# Patient Record
Sex: FEMALE | Race: WHITE | NOT HISPANIC OR LATINO | Employment: FULL TIME | URBAN - METROPOLITAN AREA
[De-identification: names, ages, dates, MRNs, and addresses within clinical notes are randomized per-mention and may not be internally consistent; named-entity substitution may affect disease eponyms.]

---

## 2023-02-03 ENCOUNTER — HOSPITAL ENCOUNTER (EMERGENCY)
Facility: HOSPITAL | Age: 38
Discharge: HOME | End: 2023-02-03
Attending: EMERGENCY MEDICINE
Payer: COMMERCIAL

## 2023-02-03 VITALS
BODY MASS INDEX: 43.32 KG/M2 | HEART RATE: 74 BPM | OXYGEN SATURATION: 99 % | TEMPERATURE: 97 F | WEIGHT: 260 LBS | DIASTOLIC BLOOD PRESSURE: 87 MMHG | RESPIRATION RATE: 18 BRPM | HEIGHT: 65 IN | SYSTOLIC BLOOD PRESSURE: 148 MMHG

## 2023-02-03 DIAGNOSIS — S39.012A LUMBAR STRAIN, INITIAL ENCOUNTER: Primary | ICD-10-CM

## 2023-02-03 DIAGNOSIS — M53.3 SACROILIAC JOINT DISEASE: ICD-10-CM

## 2023-02-03 PROCEDURE — 63600175 PHARM REV CODE 636 W HCPCS: Performed by: PHYSICIAN ASSISTANT

## 2023-02-03 PROCEDURE — 96372 THER/PROPH/DIAG INJ SC/IM: CPT | Performed by: PHYSICIAN ASSISTANT

## 2023-02-03 PROCEDURE — 99284 EMERGENCY DEPT VISIT MOD MDM: CPT

## 2023-02-03 PROCEDURE — 25000003 PHARM REV CODE 250: Performed by: PHYSICIAN ASSISTANT

## 2023-02-03 RX ORDER — KETOROLAC TROMETHAMINE 10 MG/1
10 TABLET, FILM COATED ORAL EVERY 6 HOURS
Qty: 20 TABLET | Refills: 0 | Status: SHIPPED | OUTPATIENT
Start: 2023-02-03 | End: 2023-02-08

## 2023-02-03 RX ORDER — METHOCARBAMOL 500 MG/1
500 TABLET, FILM COATED ORAL
Qty: 20 TABLET | Refills: 0 | Status: SHIPPED | OUTPATIENT
Start: 2023-02-03 | End: 2023-02-13

## 2023-02-03 RX ORDER — KETOROLAC TROMETHAMINE 30 MG/ML
60 INJECTION, SOLUTION INTRAMUSCULAR; INTRAVENOUS
Status: COMPLETED | OUTPATIENT
Start: 2023-02-03 | End: 2023-02-03

## 2023-02-03 RX ORDER — METHOCARBAMOL 500 MG/1
1000 TABLET, FILM COATED ORAL
Status: COMPLETED | OUTPATIENT
Start: 2023-02-03 | End: 2023-02-03

## 2023-02-03 RX ADMIN — METHOCARBAMOL 1000 MG: 500 TABLET ORAL at 02:02

## 2023-02-03 RX ADMIN — KETOROLAC TROMETHAMINE 60 MG: 30 INJECTION, SOLUTION INTRAMUSCULAR; INTRAVENOUS at 02:02

## 2023-02-03 NOTE — ED PROVIDER NOTES
Encounter Date: 2/3/2023       History     Chief Complaint   Patient presents with    Back Pain     Pt to er c/o having backpain for the past two months and is now worse.     37-year-old obese female comes in with complaint of low back pain.  Her pain has been constant in nature for the last few weeks.  Aggravate throughout the date.  Patient states her pain does not radiate to the lower extremities most of her pain is localized into the lower lumbar/sacroiliac bilaterally.       Back Pain   This is a recurrent problem. The current episode started several days ago. The problem occurs throughout the day. The problem has been waxing and waning. The pain is associated with no known injury. The pain is present in the sacro-iliac joint and lumbar spine. The symptoms are aggravated by bending. The pain is Worse during the day. Pertinent negatives include no chest pain, no fever, no numbness, no headaches, no bowel incontinence, no dysuria, no pelvic pain, no tingling and no weakness. She has tried NSAIDs, ice, heat and analgesics for the symptoms. Risk factors include obesity.   Review of patient's allergies indicates:  No Known Allergies  No past medical history on file.  No past surgical history on file.  No family history on file.     Review of Systems   Constitutional:  Negative for fever.   HENT:  Negative for sore throat.    Respiratory:  Negative for shortness of breath.    Cardiovascular:  Negative for chest pain.   Gastrointestinal:  Negative for bowel incontinence and nausea.   Genitourinary:  Negative for dysuria and pelvic pain.   Musculoskeletal:  Positive for back pain.   Skin:  Negative for rash.   Neurological:  Negative for tingling, weakness, numbness and headaches.   Hematological:  Does not bruise/bleed easily.     Physical Exam     Initial Vitals [02/03/23 1318]   BP Pulse Resp Temp SpO2   (!) 154/93 77 20 97.2 °F (36.2 °C) 98 %      MAP       --         Physical Exam    Vitals  reviewed.  Cardiovascular:  Normal rate.           Pulmonary/Chest: Breath sounds normal.   Musculoskeletal:      Cervical back: Normal.      Thoracic back: Normal.      Lumbar back: Spasms, tenderness and bony tenderness present. No swelling or lacerations. Normal range of motion. Positive left straight leg raise test.      Comments: Negative for straight leg raise bilaterally.  Facet degenerative pain.  Positive for sacroiliac joint pain bilaterally positive for sacral compression     Neurological: She is alert and oriented to person, place, and time.   Skin: Skin is warm.   Psychiatric: Her behavior is normal. Judgment and thought content normal.       ED Course   Procedures  Labs Reviewed - No data to display       Imaging Results              X-Ray Lumbar Spine 5 View (Final result)  Result time 02/03/23 14:52:42      Final result by José Miguel Gambino MD (02/03/23 14:52:42)                   Impression:      No acute findings.      Electronically signed by: José Miguel Gambino  Date:    02/03/2023  Time:    14:52               Narrative:    EXAMINATION:  XR LUMBAR SPINE COMPLETE 5 VIEW    CLINICAL HISTORY:  Back pain    COMPARISON:  None    FINDINGS:  Frontal and lateral views of the lumbar spine.  Alignment is within normal limits.  There is no significant loss of vertebral body height.  There is minimal degenerative change.                                       Medications   methocarbamoL tablet 1,000 mg (1,000 mg Oral Given 2/3/23 1424)   ketorolac injection 60 mg (60 mg Intramuscular Given 2/3/23 1424)     Medical Decision Making:   Initial Assessment:   37-year-old obese female comes in with complaint of low back pain.  Her pain has been constant in nature for the last few weeks.  Aggravate throughout the date.  Patient states her pain does not radiate to the lower extremities most of her pain is localized into the lower lumbar/sacroiliac bilaterally.       Differential Diagnosis:   Lumbar strain, lumbar radicular  pain, sacroiliitis bilaterally, lumbar facets generated pain, lumbar degenerative disease  Clinical Tests:   Radiological Study: Reviewed  ED Management:  Ready evaluation shows positive for facet joint pain no acute abnormality  For her lumbar pain and sacroiliac joint pain treatment here the ED we provided relief with an IM ketorolac and p.o. Robaxin.  Evaluated patient 35-40 minutes later patient states pain has been decreased to a 3/10 at its worst  The patient is resting comfortably in no acute distress.  He is hemodynamically stable and is without objective evidence for acute process requiring urgent intervention or hospitalization. I provided counseling to patient with regard to condition, the treatment plan, specific conditions for return, and the importance of follow up. Detailed written and verbal instructions provided to patient and he expressed a verbal understanding of the discharge instructions and overall management plan. Reiterated the importance of medication administration and safety and advised patient to follow up with primary care provider in 3-5 days or sooner if needed.  Answered questions at this time. The patient is stable for discharge.       Explained to patient to follow up with Dr. Cecelia Youssef for further imaging for her lumbar spine and evaluation possibly to treat facet joint mediated pain.           ED Course as of 02/03/23 1524   Fri Feb 03, 2023   1519 Improved lumbar and sacroiliac pain  [YG]      ED Course User Index  [YG] PHYLLIS Pham                 Clinical Impression:   Final diagnoses:  [S39.012A] Lumbar strain, initial encounter (Primary)  [M53.3] Sacroiliac joint disease        ED Disposition Condition    Discharge Stable          ED Prescriptions       Medication Sig Dispense Start Date End Date Auth. Provider    ketorolac (TORADOL) 10 mg tablet Take 1 tablet (10 mg total) by mouth every 6 (six) hours. for 5 days 20 tablet 2/3/2023 2/8/2023 PHYLLIS Pham     methocarbamoL (ROBAXIN) 500 MG Tab Take 1 tablet (500 mg total) by mouth 2 times daily 2 hours after meal. for 10 days 20 tablet 2/3/2023 2/13/2023 PHYLLIS Pham          Follow-up Information       Follow up With Specialties Details Why Contact Info    [facility] - Emergency Dept Emergency Medicine  If symptoms worsen [id number]  [facility] [id number]  [phone]             PHYLLIS Pham  02/03/23 1529       Leana Palmer MD  02/03/23 7426

## 2023-03-15 ENCOUNTER — OFFICE VISIT (OUTPATIENT)
Dept: PAIN MEDICINE | Facility: CLINIC | Age: 38
End: 2023-03-15
Payer: COMMERCIAL

## 2023-03-15 ENCOUNTER — HOSPITAL ENCOUNTER (OUTPATIENT)
Dept: RADIOLOGY | Facility: HOSPITAL | Age: 38
Discharge: HOME | End: 2023-03-15
Attending: NURSE PRACTITIONER
Payer: COMMERCIAL

## 2023-03-15 VITALS
BODY MASS INDEX: 48.82 KG/M2 | HEIGHT: 65 IN | SYSTOLIC BLOOD PRESSURE: 129 MMHG | TEMPERATURE: 98 F | WEIGHT: 293 LBS | DIASTOLIC BLOOD PRESSURE: 105 MMHG | HEART RATE: 95 BPM

## 2023-03-15 DIAGNOSIS — G89.29 CHRONIC SI JOINT PAIN: ICD-10-CM

## 2023-03-15 DIAGNOSIS — M53.3 CHRONIC SI JOINT PAIN: ICD-10-CM

## 2023-03-15 DIAGNOSIS — S39.012S LUMBAR STRAIN, SEQUELA: Primary | ICD-10-CM

## 2023-03-15 DIAGNOSIS — M53.3 SACROILIAC JOINT DISEASE: ICD-10-CM

## 2023-03-15 DIAGNOSIS — E66.3 OVERWEIGHT: ICD-10-CM

## 2023-03-15 PROCEDURE — 3074F PR MOST RECENT SYSTOLIC BLOOD PRESSURE < 130 MM HG: ICD-10-PCS | Mod: CPTII,,, | Performed by: NURSE PRACTITIONER

## 2023-03-15 PROCEDURE — 3080F DIAST BP >= 90 MM HG: CPT | Mod: CPTII,,, | Performed by: NURSE PRACTITIONER

## 2023-03-15 PROCEDURE — 3008F PR BODY MASS INDEX (BMI) DOCUMENTED: ICD-10-PCS | Mod: CPTII,,, | Performed by: NURSE PRACTITIONER

## 2023-03-15 PROCEDURE — 1160F PR REVIEW ALL MEDS BY PRESCRIBER/CLIN PHARMACIST DOCUMENTED: ICD-10-PCS | Mod: CPTII,,, | Performed by: NURSE PRACTITIONER

## 2023-03-15 PROCEDURE — 72202 X-RAY EXAM SI JOINTS 3/> VWS: CPT | Mod: TC

## 2023-03-15 PROCEDURE — 3008F BODY MASS INDEX DOCD: CPT | Mod: CPTII,,, | Performed by: NURSE PRACTITIONER

## 2023-03-15 PROCEDURE — 1160F RVW MEDS BY RX/DR IN RCRD: CPT | Mod: CPTII,,, | Performed by: NURSE PRACTITIONER

## 2023-03-15 PROCEDURE — 3074F SYST BP LT 130 MM HG: CPT | Mod: CPTII,,, | Performed by: NURSE PRACTITIONER

## 2023-03-15 PROCEDURE — 99205 PR OFFICE/OUTPT VISIT, NEW, LEVL V, 60-74 MIN: ICD-10-PCS | Mod: ,,, | Performed by: NURSE PRACTITIONER

## 2023-03-15 PROCEDURE — 1159F MED LIST DOCD IN RCRD: CPT | Mod: CPTII,,, | Performed by: NURSE PRACTITIONER

## 2023-03-15 PROCEDURE — 3080F PR MOST RECENT DIASTOLIC BLOOD PRESSURE >= 90 MM HG: ICD-10-PCS | Mod: CPTII,,, | Performed by: NURSE PRACTITIONER

## 2023-03-15 PROCEDURE — 99205 OFFICE O/P NEW HI 60 MIN: CPT | Mod: ,,, | Performed by: NURSE PRACTITIONER

## 2023-03-15 PROCEDURE — 1159F PR MEDICATION LIST DOCUMENTED IN MEDICAL RECORD: ICD-10-PCS | Mod: CPTII,,, | Performed by: NURSE PRACTITIONER

## 2023-03-15 RX ORDER — ACETAMINOPHEN 325 MG/1
650 TABLET ORAL EVERY 6 HOURS PRN
COMMUNITY

## 2023-03-15 NOTE — PATIENT INSTRUCTIONS
Call your  or HR contact at work to confirm your insurance covers office visits, labs and physical therapy, treatments,etc with [facility] in [place], [place]     If [facility] with your insurance company, you will need to complete the  following items and call back to schedule a follow up visit.       1. Ordered labs: CMP, CBC baseline labs. Collect these at any [facility] in [place], [place] if insurance approves   2. Ordered Bilateral SI joint x-ray complete views today. Will call patient back with results and recommendation.   3. Sent Dietitian consult to assist with weight loss. They will call you to schedule.  4.  Physical therapy needed. Patient to call back and relay what local PT clinic is in her network. I will place order at that time to evaluate and treat bilateral SI joint pain and lumbar strain    5.  Follow-up to be determined pending above items completed.

## 2023-03-15 NOTE — PROGRESS NOTES
Subjective:      Patient ID: Ingrid Dyer is a 37 y.o. female.    Chief Complaint: Back Pain (1 month pain in the lower back to the sciatic. Goes down the legs  Taking OTC which helps a little. Home exercise)    Referred by: Kayla Waggoner PA     HPI: Patient presents as a new consult from ER for lumbar strain, initial encounter and chronic sacroiliac joint pain. Patient hs PCP Vikas Logan consult pending in [place].     Pain located to right buttock with shooting pain down posterior right leg down to right foot and great toe on right foot. Feels like a stabbing pain. Pain worse with prolonged sitting, walking. Sleep interrupted. She can only lie in a fetal postion on the . Pain improved with sitting on firm couch with cushion. Standing improves pain and prolonged walking makes it worse. She works at [employer]. 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 She [place] during [place] and had to make several stops to sit due to pain to right buttock and shooting down right leg, foot and great toe.     Pain started spontaneously beginning of January without injury. No prior back surgeries. She went to ER as she did not have a PCP. She has a new patient consult with PCP appointment pending. Last ER visit 2/3/2023. She received a muscle relaxer and IM ketorolac. This reduced her pain to 3/10.     Current pain rating 2/10 today. >walking raises pain to 5/10. Pain highest 9/10 if she moves wrong way, prolonged sitting and sleeping on her bed vs. Couch. She has ordered a new bed. Pain meds include Tylenol 500 mg BID, smoking marijuana, No NSAID'S, No Celebrex or Meloxicam, biofreeze. No Diclonac gel or oral med. No Lidoderm patch.     Only imaging patient has of spine is lumbar Xray with recent ER visit. No acute findings and minimal degenerative changes. No PT visits as she just started receiving insurance.       PMH: no HTN dx,   PSH: appendectomy  Negative spine surgeries or  interventional pain procedures    Interventional Pain History  none    ROS:  Right upper buttock, right leg and right foot pain    SI joint imaging:   None on file    Imaging lumbar spine  2023  X-ray lumbar spine complete five view  IMPRESSION:    No acute findings  Frontal and lateral views of the lumbar spine.  Alignment is within normal limits.  There is no significant loss of vertebral body height.  There is minimal degenerative change.    MRI lumbar spine:  None in electronic medical record,  or care everywhere    SI joint imaging:   None in electronic medical record,  or care everywhere    Pertinent labs:  None in electronic medical record,  or care everywhere        Objective:          Physical Exam  General: Well developed; morbidly obese ; A&O x 3; No anxiety/depression; NAD  Mental Status: Oriented to person, palce and time. Displays appropriate mood & affect.  Head: Norm cephalic and atraumatic  Neck: Midline trachea  Eyes: normal conjunctiva, normal lids, normal pupils  ENT and mouth: normal external ear, nose, and no lesions noted on the lips.  Respiratory: Symmetrical, Unlabored. No dyspnea  CV: normal  S1/S2, normal rhythm and rate. No peripheral edema.   Abdomen: pendulus    Extremities:  Gen: No cyanosis or tenderness to palpation bilateral upper and lower extremities  Skin: Warm, pink, dry, no rashes, no lesions on the lumbar spine  Strength: 5/5 motor strength bilateral upper and lower  ROM: Full ROM in bilateral knees and ankles without pain or instability.    Neuro:  Gait: no altalgic lean, normal toe and heel raise. Painful gait . Independent ambulator  DTR's: 2+ in bilateral patellar  Sensory: Intact to light touch bilateral  upper and lower extremities. Walking  causes numbness to right thigh.     Spine: Normal lordosis. No scoliosis  L-spine ROM: Full ROM and no pain with  flexion, extension, bilateral rotation,   Straight Leg Raise: + right. , neg  left   SI Joint:+ pain right SI with + ZORAIDA on right.  No tenderness to left SI joint.           Assessment:       + FABERS right SI joint.   Bilateral SI joint x-ray complete to be obtained today  She has no current labs  Pending new patient appt with PCP this month      Encounter Diagnoses   Name Primary?    Lumbar strain, sequela Yes    Chronic SI joint pain     Sacroiliac joint disease     Overweight          Plan:     1. Ordered CMP, CBC baseline labs. To be completed at any [facility] in [place], [place] if insurance approves   2. Ordered Bilateral SI joint x-ray complete views today. Will call patient back with results and recommendation.  Suspect right sacroiliitis.  3. Sent Dietitian consult to assist with weight loss  4.  Physical therapy needed. Patient to call back and relay what local PT clinic is in her network. I will place order at that time to evaluate and treat bilateral SI joint pain and lumbar strain  5.  Follow-up to be determined pending above items completed.  6. Establish care with PCP and let him/her know about her elevated blood pressure and no history of hypertension.  This may be related to pain          April was seen today for back pain.    Diagnoses and all orders for this visit:    Lumbar strain, sequela    Chronic SI joint pain  -     X-Ray Sacroiliac Joints Complete; Future    Sacroiliac joint disease  -     Ambulatory referral/consult to Pain Clinic    Overweight  -     Ambulatory referral/consult to Nutrition Services; Future               History reviewed. No pertinent past medical history.    Past Surgical History:   Procedure Laterality Date    APPENDECTOMY         Family History   Problem Relation Age of Onset    No Known Problems Mother     Diabetes Father        Social History     Socioeconomic History    Marital status: Single   Tobacco Use    Smoking status: Never    Smokeless tobacco: Never   Substance and Sexual Activity    Alcohol use: Not Currently    Drug use: Yes      Frequency: 1.0 times per week     Types: Marijuana       Current Outpatient Medications   Medication Sig Dispense Refill    acetaminophen (TYLENOL) 325 MG tablet Take 650 mg by mouth every 6 (six) hours as needed for Pain.       No current facility-administered medications for this visit.       Review of patient's allergies indicates:  No Known Allergies

## 2023-07-18 ENCOUNTER — HOSPITAL ENCOUNTER (EMERGENCY)
Facility: HOSPITAL | Age: 38
Discharge: HOME | End: 2023-07-18
Attending: EMERGENCY MEDICINE
Payer: COMMERCIAL

## 2023-07-18 VITALS
DIASTOLIC BLOOD PRESSURE: 68 MMHG | TEMPERATURE: 99 F | WEIGHT: 293 LBS | BODY MASS INDEX: 48.82 KG/M2 | OXYGEN SATURATION: 97 % | SYSTOLIC BLOOD PRESSURE: 138 MMHG | HEIGHT: 65 IN | HEART RATE: 82 BPM | RESPIRATION RATE: 18 BRPM

## 2023-07-18 DIAGNOSIS — H60.503 ACUTE OTITIS EXTERNA OF BOTH EARS, UNSPECIFIED TYPE: Primary | ICD-10-CM

## 2023-07-18 PROCEDURE — 99284 EMERGENCY DEPT VISIT MOD MDM: CPT

## 2023-07-18 RX ORDER — CIPROFLOXACIN AND DEXAMETHASONE 3; 1 MG/ML; MG/ML
4 SUSPENSION/ DROPS AURICULAR (OTIC) 2 TIMES DAILY
Qty: 7.5 ML | Refills: 0 | Status: SHIPPED | OUTPATIENT
Start: 2023-07-18 | End: 2023-07-25

## 2023-07-18 RX ORDER — AMOXICILLIN AND CLAVULANATE POTASSIUM 875; 125 MG/1; MG/1
1 TABLET, FILM COATED ORAL EVERY 12 HOURS
Qty: 14 TABLET | Refills: 0 | Status: SHIPPED | OUTPATIENT
Start: 2023-07-18 | End: 2023-07-25

## 2023-07-18 NOTE — ED PROVIDER NOTES
Encounter Date: 7/18/2023       History     Chief Complaint   Patient presents with    Otalgia     C/o pat ear pain X 1 day. States worse in right ear. Recently took abx for cellulitis of hand     37 y.o. White female presents to Emergency Department with a chief complaint of otalgia. Symptoms began on yesterday and have been constant since onset. Patient reports she recently went swimming. Associated symptoms include ear drainage. Symptoms are aggravated with palpation and there are no alleviating factors. The patient denies CP, SOB, weakness, fever, cough, or dizziness. No other reported symptoms at this time      The history is provided by the patient. No  was used.   Otalgia  This is a new problem. The current episode started yesterday. There is pain in both ears. The problem occurs throughout the day. The problem has been unchanged. Associated symptoms include ear discharge. Pertinent negatives include no headaches, no rhinorrhea, no sore throat, no abdominal pain, no diarrhea, no vomiting, no neck pain, no cough and no rash.   Review of patient's allergies indicates:  No Known Allergies  History reviewed. No pertinent past medical history.  Past Surgical History:   Procedure Laterality Date    APPENDECTOMY       Family History   Problem Relation Age of Onset    No Known Problems Mother     Diabetes Father      Social History     Tobacco Use    Smoking status: Never    Smokeless tobacco: Never   Substance Use Topics    Alcohol use: Not Currently    Drug use: Yes     Frequency: 1.0 times per week     Types: Marijuana     Review of Systems   Constitutional:  Negative for chills, fatigue and fever.   HENT:  Positive for ear discharge and ear pain. Negative for rhinorrhea and sore throat.    Respiratory:  Negative for cough.    Gastrointestinal:  Negative for abdominal pain, diarrhea, nausea and vomiting.   Musculoskeletal:  Negative for neck pain.   Skin:  Negative for rash.   Neurological:   Negative for headaches.   All other systems reviewed and are negative.    Physical Exam     Initial Vitals [07/18/23 1815]   BP Pulse Resp Temp SpO2   138/68 82 18 98.6 °F (37 °C) 97 %      MAP       --         Physical Exam    Nursing note and vitals reviewed.  Constitutional: She appears well-developed and well-nourished. She is not diaphoretic. She is Obese . She is cooperative.  Non-toxic appearance. No distress.   HENT:   Head: Normocephalic and atraumatic.   Right Ear: Hearing normal. There is drainage and tenderness. No swelling. Tympanic membrane is erythematous. No decreased hearing is noted.   Left Ear: Hearing, external ear and ear canal normal. There is tenderness. No swelling. Tympanic membrane is erythematous. No decreased hearing is noted.   Nose: Nose normal.   Mouth/Throat: Oropharynx is clear and moist.   Tenderness noted to R external ear. Purulent drainage noted to R ear canal.    Eyes: Conjunctivae and EOM are normal. Pupils are equal, round, and reactive to light.   Neck: Neck supple.   Normal range of motion.  Cardiovascular:  Normal rate, regular rhythm, S1 normal, S2 normal, normal heart sounds, intact distal pulses and normal pulses.           Pulmonary/Chest: Effort normal and breath sounds normal. No respiratory distress. She has no wheezes. She exhibits no tenderness.   Abdominal: Abdomen is soft. Bowel sounds are normal. She exhibits no distension. There is no abdominal tenderness.   Musculoskeletal:         General: No tenderness or edema. Normal range of motion.      Cervical back: Normal range of motion and neck supple.     Neurological: She is alert and oriented to person, place, and time. She has normal strength. No sensory deficit. GCS score is 15. GCS eye subscore is 4. GCS verbal subscore is 5. GCS motor subscore is 6.   Skin: Skin is warm and dry. Capillary refill takes less than 2 seconds. No rash noted. No erythema.   Psychiatric: She has a normal mood and affect. Thought  content normal.       ED Course   Procedures  Labs Reviewed - No data to display       Imaging Results    None          Medications - No data to display  Medical Decision Making:   Initial Assessment:   Patient awake, alert, has non-labored breathing, and follows commands appropriately. C/o bilateral ear pain that began on yesterday. Patient reports she recently went swimming. Afebrile. States she noticed a moderate amount of purulent drainage come out of her R ear. NAD noted.   Differential Diagnosis:   Otitis Media, Otitis Externa, Otalgia   ED Management:  Co-morbidities and/or factors adding to the complexity or risk for the patient?: none  Differential diagnoses: Otitis Media, Otitis Externa, Otalgia   Decision to obtain previous or outside records?: I did not review records.   Chart Review (nursing home, outside records, CareEverywhere): none  Review of RX medications/new RX prescribed by me?: Prescribed Augmentin and Ciprodex.   Labs/imaging/other tests obtained/considered (risk/benefits of testing discussed): none  Labs/tests intepretation: none  My independent imaging interpretation: none  Treatment/interventions, IV fluids, IV medications: none  Complex management (IV controlled substances, went to OR, DNR, meds requiring monitoring, transfer, etc)?: none  Workup/treatment affected by social determinants of health?:none   Point of care US done/interpretation: none  Consults/radiologist/EMS/social work/family discussion/alternate history: none  Advanced care planning/end of life discussion: none  Shared decision making: Discussed plan of care and interventions with patient. Agreed to and aware of plan of care. Comfortable being discharged home.   ETOH/smoking/drug cessation discussion: none  Dispo: Patient discharged home. Patient denies new or additional complaints; no further tests indicated at this time. Verbalized understanding of instructions. No emergent or apparent distress noted prior to discharge.  To follow up with PCP in 1 week as needed. Strict ER return precautions given.                             Clinical Impression:   Final diagnoses:  [H60.503] Acute otitis externa of both ears, unspecified type (Primary)        ED Disposition Condition    Discharge Stable          ED Prescriptions       Medication Sig Dispense Start Date End Date Auth. Provider    amoxicillin-clavulanate 875-125mg (AUGMENTIN) 875-125 mg per tablet Take 1 tablet by mouth every 12 (twelve) hours. for 7 days 14 tablet 7/18/2023 7/25/2023 Ernestina Powell NP    ciprofloxacin-dexAMETHasone 0.3-0.1% (CIPRODEX) 0.3-0.1 % DrpS Place 4 drops into both ears 2 (two) times daily. for 7 days 7.5 mL 7/18/2023 7/25/2023 Ernestina Powell NP          Follow-up Information       Follow up With Specialties Details Why Contact Info    PCP  Call in 1 week As needed, If symptoms worsen     [facility] - Emergency Dept Emergency Medicine Go to  If symptoms worsen, As needed [id number]  [facility] [id number] [phone]             Ernestina Powell NP  07/18/23 2623